# Patient Record
Sex: FEMALE | Race: WHITE | Employment: OTHER | URBAN - METROPOLITAN AREA
[De-identification: names, ages, dates, MRNs, and addresses within clinical notes are randomized per-mention and may not be internally consistent; named-entity substitution may affect disease eponyms.]

---

## 2017-05-05 LAB
ABO + RH BLD: NORMAL
ABO + RH BLD: NORMAL
BLD GP AB SCN SERPL QL: NEGATIVE
HBV SURFACE AG SERPL QL IA: NEGATIVE
HIV 1+2 AB+HIV1 P24 AG SERPL QL IA: NEGATIVE
RUBELLA ANTIBODY IGG QUANTITATIVE: NORMAL IU/ML
T PALLIDUM IGG SER QL: NEGATIVE

## 2017-06-28 ENCOUNTER — TRANSFERRED RECORDS (OUTPATIENT)
Dept: HEALTH INFORMATION MANAGEMENT | Facility: CLINIC | Age: 32
End: 2017-06-28

## 2017-07-01 ENCOUNTER — HEALTH MAINTENANCE LETTER (OUTPATIENT)
Age: 32
End: 2017-07-01

## 2017-07-13 ENCOUNTER — PRE VISIT (OUTPATIENT)
Dept: MATERNAL FETAL MEDICINE | Facility: CLINIC | Age: 32
End: 2017-07-13

## 2017-07-20 ENCOUNTER — OFFICE VISIT (OUTPATIENT)
Dept: MATERNAL FETAL MEDICINE | Facility: CLINIC | Age: 32
End: 2017-07-20
Attending: OBSTETRICS & GYNECOLOGY
Payer: COMMERCIAL

## 2017-07-20 ENCOUNTER — HOSPITAL ENCOUNTER (OUTPATIENT)
Dept: ULTRASOUND IMAGING | Facility: CLINIC | Age: 32
Discharge: HOME | End: 2017-07-20
Attending: OBSTETRICS & GYNECOLOGY | Admitting: OBSTETRICS & GYNECOLOGY
Payer: COMMERCIAL

## 2017-07-20 DIAGNOSIS — O09.292 H/O FETAL ANOMALY IN PRIOR PREGNANCY, CURRENTLY PREGNANT, SECOND TRIMESTER: Primary | ICD-10-CM

## 2017-07-20 DIAGNOSIS — O26.90 PREGNANCY RELATED CONDITION, UNSPECIFIED TRIMESTER: ICD-10-CM

## 2017-07-20 PROCEDURE — 76811 OB US DETAILED SNGL FETUS: CPT

## 2017-07-20 NOTE — PROGRESS NOTES
Please see imaging tab for complete ultrasound report.      Jeet Bedolla MD  Maternal-Fetal Medicine

## 2017-07-20 NOTE — MR AVS SNAPSHOT
After Visit Summary   7/20/2017    Ekta Mendez    MRN: [id number]           Patient Information     Date Of Birth          1985        Visit Information        Provider Department      7/20/2017 9:15 AM Jeet Bedolla MD [facility] Maternal Fetal Medicine [facility]        Today's Diagnoses     H/O fetal anomaly in prior pregnancy, currently pregnant, second trimester    -  1       Follow-ups after your visit        Who to contact     If you have questions or need follow up information about today's clinic visit or your schedule please contact [facility] MATERNAL FETAL MEDICINE [facility] directly at [phone].  Normal or non-critical lab and imaging results will be communicated to you by Hyper9hart, letter or phone within 4 business days after the clinic has received the results. If you do not hear from us within 7 days, please contact the clinic through Adjugt or phone. If you have a critical or abnormal lab result, we will notify you by phone as soon as possible.  Submit refill requests through TYMR or call your pharmacy and they will forward the refill request to us. Please allow 3 business days for your refill to be completed.          Additional Information About Your Visit        MyChart Information     TYMR gives you secure access to your electronic health record. If you see a primary care provider, you can also send messages to your care team and make appointments. If you have questions, please call your primary care clinic.  If you do not have a primary care provider, please call [phone] and they will assist you.        Care EveryWhere ID     This is your Care EveryWhere ID. This could be used by other organizations to access your [place] medical records  [id number]        Your Vitals Were     Last Period                   03/06/2017            Blood Pressure from Last 3 Encounters:   10/02/14 126/83    Weight from Last 3 Encounters:   09/30/14 70.8 kg (156  lb)              Today, you had the following     No orders found for display       Primary Care Provider Office Phone # Fax #    Kellie Church [phone]       [facility] [address] [facility] [address]  [facility] [address]        Equal Access to Services     [facility] : Hadii aad ku hadasho Soomaali, waaxda luqadaha, qaybta kaalmada adeegyada, waxay lioin maryn luis arcadio erika . So [facility] [phone].    ATENCIÓN: Si habla español, tiene a saunders disposición servicios gratuitos de asistencia lingüística. Llame al [phone].    We comply with applicable federal civil rights laws and [place] laws. We do not discriminate on the basis of race, color, national origin, age, disability sex, sexual orientation or gender identity.            Thank you!     Thank you for choosing [facility]ERNAL FETAL MEDIC[facility]  for your care. Our goal is always to provide you with excellent care. Hearing back from our patients is one way we can continue to improve our services. Please take a few minutes to complete the written survey that you may receive in the mail after your visit with us. Thank you!             Your Updated Medication List - Protect others around you: Learn how to safely use, store and throw away your medicines at www.disposemymeds.org.          This list is accurate as of: 7/20/17  9:51 AM.  Always use your most recent med list.                   Brand Name Dispense Instructions for use Diagnosis    acetaminophen 325 MG tablet    TYLENOL     Take 2 tablets (650 mg) by mouth every 4 hours as needed for mild pain (fever greater than 102?F)    Indication for care in labor or delivery       ibuprofen 400-800 mg tablet    ADVIL,MOTRIN     Take 1-2 tablets (400-800 mg) by mouth every 6 hours as needed for other (cramping)    Indication for care in labor or delivery       PRENATAL VITAMINS PO      Take 1 tablet by mouth daily

## 2017-11-15 LAB — GROUP B STREP PCR: POSITIVE

## 2017-11-29 ENCOUNTER — HOSPITAL ENCOUNTER (INPATIENT)
Facility: CLINIC | Age: 32
LOS: 1 days | Discharge: HOME HEALTH | End: 2017-11-30
Attending: OBSTETRICS & GYNECOLOGY | Admitting: OBSTETRICS & GYNECOLOGY
Payer: COMMERCIAL

## 2017-11-29 LAB
A1 MICROGLOB PLACENTAL VAG QL: POSITIVE
ABO + RH BLD: NORMAL
ABO + RH BLD: NORMAL
BLD GP AB SCN SERPL QL: NORMAL
BLOOD BANK CMNT PATIENT-IMP: NORMAL
HGB BLD-MCNC: 12.9 G/DL (ref 11.7–15.7)
SPECIMEN EXP DATE BLD: NORMAL
T PALLIDUM IGG+IGM SER QL: NEGATIVE

## 2017-11-29 PROCEDURE — 25000132 ZZH RX MED GY IP 250 OP 250 PS 637: Performed by: OBSTETRICS & GYNECOLOGY

## 2017-11-29 PROCEDURE — 86900 BLOOD TYPING SEROLOGIC ABO: CPT | Performed by: OBSTETRICS & GYNECOLOGY

## 2017-11-29 PROCEDURE — 85018 HEMOGLOBIN: CPT | Performed by: OBSTETRICS & GYNECOLOGY

## 2017-11-29 PROCEDURE — 86850 RBC ANTIBODY SCREEN: CPT | Performed by: OBSTETRICS & GYNECOLOGY

## 2017-11-29 PROCEDURE — 12000037 ZZH R&B POSTPARTUM INTERMEDIATE

## 2017-11-29 PROCEDURE — 72200001 ZZH LABOR CARE VAGINAL DELIVERY SINGLE

## 2017-11-29 PROCEDURE — 36415 COLL VENOUS BLD VENIPUNCTURE: CPT | Performed by: OBSTETRICS & GYNECOLOGY

## 2017-11-29 PROCEDURE — 99215 OFFICE O/P EST HI 40 MIN: CPT | Mod: 25

## 2017-11-29 PROCEDURE — 84112 EVAL AMNIOTIC FLUID PROTEIN: CPT | Performed by: OBSTETRICS & GYNECOLOGY

## 2017-11-29 PROCEDURE — 86780 TREPONEMA PALLIDUM: CPT | Performed by: OBSTETRICS & GYNECOLOGY

## 2017-11-29 PROCEDURE — 86901 BLOOD TYPING SEROLOGIC RH(D): CPT | Performed by: OBSTETRICS & GYNECOLOGY

## 2017-11-29 PROCEDURE — 25000125 ZZHC RX 250: Performed by: OBSTETRICS & GYNECOLOGY

## 2017-11-29 PROCEDURE — 59025 FETAL NON-STRESS TEST: CPT

## 2017-11-29 PROCEDURE — 25000128 H RX IP 250 OP 636: Performed by: OBSTETRICS & GYNECOLOGY

## 2017-11-29 RX ORDER — LANOLIN 100 %
OINTMENT (GRAM) TOPICAL
Status: DISCONTINUED | OUTPATIENT
Start: 2017-11-29 | End: 2017-11-30 | Stop reason: HOSPADM

## 2017-11-29 RX ORDER — IBUPROFEN 400 MG/1
800 TABLET, FILM COATED ORAL
Status: COMPLETED | OUTPATIENT
Start: 2017-11-29 | End: 2017-11-29

## 2017-11-29 RX ORDER — FENTANYL CITRATE 50 UG/ML
50-100 INJECTION, SOLUTION INTRAMUSCULAR; INTRAVENOUS
Status: DISCONTINUED | OUTPATIENT
Start: 2017-11-29 | End: 2017-11-30

## 2017-11-29 RX ORDER — OXYTOCIN/0.9 % SODIUM CHLORIDE 30/500 ML
100-340 PLASTIC BAG, INJECTION (ML) INTRAVENOUS CONTINUOUS PRN
Status: COMPLETED | OUTPATIENT
Start: 2017-11-29 | End: 2017-11-29

## 2017-11-29 RX ORDER — NALOXONE HYDROCHLORIDE 0.4 MG/ML
.1-.4 INJECTION, SOLUTION INTRAMUSCULAR; INTRAVENOUS; SUBCUTANEOUS
Status: DISCONTINUED | OUTPATIENT
Start: 2017-11-29 | End: 2017-11-30 | Stop reason: HOSPADM

## 2017-11-29 RX ORDER — AMOXICILLIN 250 MG
1 CAPSULE ORAL 2 TIMES DAILY PRN
Status: DISCONTINUED | OUTPATIENT
Start: 2017-11-29 | End: 2017-11-30 | Stop reason: HOSPADM

## 2017-11-29 RX ORDER — PRENATAL VIT/IRON FUM/FOLIC AC 27MG-0.8MG
1 TABLET ORAL DAILY
Status: DISCONTINUED | OUTPATIENT
Start: 2017-11-29 | End: 2017-11-30 | Stop reason: HOSPADM

## 2017-11-29 RX ORDER — CARBOPROST TROMETHAMINE 250 UG/ML
250 INJECTION, SOLUTION INTRAMUSCULAR
Status: DISCONTINUED | OUTPATIENT
Start: 2017-11-29 | End: 2017-11-30

## 2017-11-29 RX ORDER — OXYTOCIN/0.9 % SODIUM CHLORIDE 30/500 ML
340 PLASTIC BAG, INJECTION (ML) INTRAVENOUS CONTINUOUS PRN
Status: DISCONTINUED | OUTPATIENT
Start: 2017-11-29 | End: 2017-11-30 | Stop reason: HOSPADM

## 2017-11-29 RX ORDER — NALOXONE HYDROCHLORIDE 0.4 MG/ML
.1-.4 INJECTION, SOLUTION INTRAMUSCULAR; INTRAVENOUS; SUBCUTANEOUS
Status: DISCONTINUED | OUTPATIENT
Start: 2017-11-29 | End: 2017-11-30

## 2017-11-29 RX ORDER — ACETAMINOPHEN 325 MG/1
650 TABLET ORAL EVERY 4 HOURS PRN
Status: DISCONTINUED | OUTPATIENT
Start: 2017-11-29 | End: 2017-11-30 | Stop reason: HOSPADM

## 2017-11-29 RX ORDER — OXYTOCIN 10 [USP'U]/ML
10 INJECTION, SOLUTION INTRAMUSCULAR; INTRAVENOUS
Status: DISCONTINUED | OUTPATIENT
Start: 2017-11-29 | End: 2017-11-30

## 2017-11-29 RX ORDER — OXYCODONE AND ACETAMINOPHEN 5; 325 MG/1; MG/1
1 TABLET ORAL
Status: DISCONTINUED | OUTPATIENT
Start: 2017-11-29 | End: 2017-11-30

## 2017-11-29 RX ORDER — ACETAMINOPHEN 325 MG/1
650 TABLET ORAL EVERY 4 HOURS PRN
Status: DISCONTINUED | OUTPATIENT
Start: 2017-11-29 | End: 2017-11-30

## 2017-11-29 RX ORDER — HYDROCODONE BITARTRATE AND ACETAMINOPHEN 5; 325 MG/1; MG/1
1 TABLET ORAL EVERY 4 HOURS PRN
Status: DISCONTINUED | OUTPATIENT
Start: 2017-11-29 | End: 2017-11-30 | Stop reason: HOSPADM

## 2017-11-29 RX ORDER — IBUPROFEN 400 MG/1
800 TABLET, FILM COATED ORAL EVERY 6 HOURS PRN
Status: DISCONTINUED | OUTPATIENT
Start: 2017-11-29 | End: 2017-11-30 | Stop reason: HOSPADM

## 2017-11-29 RX ORDER — PENICILLIN G POTASSIUM 5000000 [IU]/1
5 INJECTION, POWDER, FOR SOLUTION INTRAMUSCULAR; INTRAVENOUS ONCE
Status: COMPLETED | OUTPATIENT
Start: 2017-11-29 | End: 2017-11-29

## 2017-11-29 RX ORDER — OXYTOCIN/0.9 % SODIUM CHLORIDE 30/500 ML
100 PLASTIC BAG, INJECTION (ML) INTRAVENOUS CONTINUOUS
Status: DISCONTINUED | OUTPATIENT
Start: 2017-11-29 | End: 2017-11-30 | Stop reason: HOSPADM

## 2017-11-29 RX ORDER — AMOXICILLIN 250 MG
2 CAPSULE ORAL 2 TIMES DAILY PRN
Status: DISCONTINUED | OUTPATIENT
Start: 2017-11-29 | End: 2017-11-30 | Stop reason: HOSPADM

## 2017-11-29 RX ORDER — SODIUM CHLORIDE, SODIUM LACTATE, POTASSIUM CHLORIDE, CALCIUM CHLORIDE 600; 310; 30; 20 MG/100ML; MG/100ML; MG/100ML; MG/100ML
INJECTION, SOLUTION INTRAVENOUS CONTINUOUS
Status: DISCONTINUED | OUTPATIENT
Start: 2017-11-29 | End: 2017-11-30

## 2017-11-29 RX ORDER — BISACODYL 10 MG
10 SUPPOSITORY, RECTAL RECTAL DAILY PRN
Status: DISCONTINUED | OUTPATIENT
Start: 2017-12-01 | End: 2017-11-30 | Stop reason: HOSPADM

## 2017-11-29 RX ORDER — HYDROCORTISONE 2.5 %
CREAM (GRAM) TOPICAL 3 TIMES DAILY PRN
Status: DISCONTINUED | OUTPATIENT
Start: 2017-11-29 | End: 2017-11-30 | Stop reason: HOSPADM

## 2017-11-29 RX ORDER — ONDANSETRON 2 MG/ML
4 INJECTION INTRAMUSCULAR; INTRAVENOUS EVERY 6 HOURS PRN
Status: DISCONTINUED | OUTPATIENT
Start: 2017-11-29 | End: 2017-11-30

## 2017-11-29 RX ORDER — MISOPROSTOL 200 UG/1
400 TABLET ORAL
Status: DISCONTINUED | OUTPATIENT
Start: 2017-11-29 | End: 2017-11-30 | Stop reason: HOSPADM

## 2017-11-29 RX ORDER — OXYTOCIN 10 [USP'U]/ML
10 INJECTION, SOLUTION INTRAMUSCULAR; INTRAVENOUS
Status: DISCONTINUED | OUTPATIENT
Start: 2017-11-29 | End: 2017-11-30 | Stop reason: HOSPADM

## 2017-11-29 RX ORDER — METHYLERGONOVINE MALEATE 0.2 MG/ML
200 INJECTION INTRAVENOUS
Status: DISCONTINUED | OUTPATIENT
Start: 2017-11-29 | End: 2017-11-30

## 2017-11-29 RX ADMIN — ACETAMINOPHEN 650 MG: 325 TABLET, FILM COATED ORAL at 12:15

## 2017-11-29 RX ADMIN — SODIUM CHLORIDE 2.5 MILLION UNITS: 9 INJECTION, SOLUTION INTRAVENOUS at 10:09

## 2017-11-29 RX ADMIN — PENICILLIN G POTASSIUM 5 MILLION UNITS: 5000000 POWDER, FOR SOLUTION INTRAMUSCULAR; INTRAPLEURAL; INTRATHECAL; INTRAVENOUS at 06:37

## 2017-11-29 RX ADMIN — IBUPROFEN 800 MG: 400 TABLET ORAL at 12:14

## 2017-11-29 RX ADMIN — OXYTOCIN-SODIUM CHLORIDE 0.9% IV SOLN 30 UNIT/500ML 340 ML/HR: 30-0.9/5 SOLUTION at 10:56

## 2017-11-29 RX ADMIN — ACETAMINOPHEN 650 MG: 325 TABLET, FILM COATED ORAL at 19:50

## 2017-11-29 RX ADMIN — IBUPROFEN 800 MG: 400 TABLET ORAL at 19:50

## 2017-11-29 NOTE — IP AVS SNAPSHOT
MRN:[id number]                      After Visit Summary   11/29/2017    Ekta Mendez    MRN: [id number]           Thank you!     Thank you for choosing [facility] for your care. Our goal is always to provide you with excellent care. Hearing back from our patients is one way we can continue to improve our services. Please take a few minutes to complete the written survey that you may receive in the mail after you visit with us. Thank you!        Patient Information     Date Of Birth          1985        About your hospital stay     You were admitted on:  November 29, 2017 You last received care in the:  [address]    You were discharged on:  November 30, 2017       Who to Call     For medical emergencies, please call 911.  For non-urgent questions about your medical care, please call your primary care provider or clinic, [phone]          Attending Provider     Provider Specialty    Benoit Sherman MD OB/Gyn    Roslyn Bonilla MD OB/Gyn       Primary Care Provider Office Phone # Fax #    Roslyn Bonilla [phone]      Further instructions from your care team       Postpartum Vaginal Delivery Instructions    Activity       Ask family and friends for help when you need it.    Do not place anything in your vagina for 6 weeks.    You are not restricted on other activities, but take it easy for a few weeks to allow your body to recover from delivery.  You are able to do any activities you feel up to that point.    No driving until you have stopped taking your pain medications (usually two weeks after delivery).     Call your health care provider if you have any of these symptoms:       Increased pain, swelling, redness, or fluid around your stiches from an episiotomy or perineal tear.    A fever above 100.4 F (38 C) with or without chills when placing a thermometer under your tongue.    You soak a sanitary pad with blood within  "1 hour, or you see blood clots larger than a golf ball.    Bleeding that lasts more than 6 weeks.    Vaginal discharge that smells bad.    Severe pain, cramping or tenderness in your lower belly area.    A need to urinate more frequently (use the toilet more often), more urgently (use the toilet very quickly), or it burns when you urinate.    Nausea and vomiting.    Redness, swelling or pain around a vein in your leg.    Problems breastfeeding or a red or painful area on your breast.    Chest pain and cough or are gasping for air.    Problems coping with sadness, anxiety, or depression.  If you have any concerns about hurting yourself or the baby, call your provider immediately.     You have questions or concerns after you return home.     Keep your hands clean:  Always wash your hands before touching your perineal area and stitches.  This helps reduce your risk of infection.  If your hands aren't dirty, you may use an alcohol hand-rub to clean your hands. Keep your nails clean and short.     Follow up in 6 weeks       Pending Results     No orders found for last 3 day(s).            Admission Information     Date & Time Provider Department Dept. Phone    11/29/2017 Roslyn Bonilla MD [address] [phone]      Your Vitals Were     Blood Pressure Temperature Respirations Height Weight Last Period    114/74 97.2  F (36.2  C) 16 1.626 m (5' 4\") 70.8 kg (156 lb) 03/06/2017    BMI (Body Mass Index)                   26.78 kg/m2           MOGhart Information     [employer] gives you secure access to your electronic health record. If you see a primary care provider, you can also send messages to your care team and make appointments. If you have questions, please call your primary care clinic.  If you do not have a primary care provider, please call [phone] and they will assist you.        Care EveryWhere ID     This is your Care EveryWhere ID. This could be used by other organizations " to access your [place] medical records  [id number]        Equal Access to Services     SHABANA PASTOR : Hadii lyssa Avila, maverick borrero, yasmeen chamorro, kelechi galicia. So [facility] [phone].    ATENCIÓN: Si habla español, tiene a saunders disposición servicios gratuitos de asistencia lingüística. Llame al [phone].    We comply with applicable federal civil rights laws and [place] laws. We do not discriminate on the basis of race, color, national origin, age, disability, sex, sexual orientation, or gender identity.               Review of your medicines      UNREVIEWED medicines. Ask your doctor about these medicines        Dose / Directions    PRENATAL VITAMINS PO        Dose:  1 tablet   Take 1 tablet by mouth daily   Refills:  0                Protect others around you: Learn how to safely use, store and throw away your medicines at www.disposemymeds.org.             Medication List: This is a list of all your medications and when to take them. Check marks below indicate your daily home schedule. Keep this list as a reference.      Medications           Morning Afternoon Evening Bedtime As Needed    PRENATAL VITAMINS PO   Take 1 tablet by mouth daily

## 2017-11-29 NOTE — PLAN OF CARE
Problem: Patient Care Overview  Goal: Plan of Care/Patient Progress Review  Outcome: No Change  1320 Pt. admitted from L&D  via W/C and transferred to bed with standby assist. Pt. arrived with baby and was accompanied by  and arrived with personal belongings. Report was taken from bryce in L&D. VSS. Fundus is firm and midline.  Vaginal bleeding is scant.  IV infusing.  Pt. oriented to the room and call light system.

## 2017-11-29 NOTE — IP AVS SNAPSHOT
[address]    [place] [address]    Phone:  [phone]                                       After Visit Summary   11/29/2017    Ekta Mendez    MRN: [id number]           After Visit Summary Signature Page     I have received my discharge instructions, and my questions have been answered. I have discussed any challenges I see with this plan with the nurse or doctor.    ..........................................................................................................................................  Patient/Patient Representative Signature      ..........................................................................................................................................  Patient Representative Print Name and Relationship to Patient    ..................................................               ................................................  Date                                            Time    ..........................................................................................................................................  Reviewed by Signature/Title    ...................................................              ..............................................  Date                                                            Time

## 2017-11-29 NOTE — PROGRESS NOTES
Admission date: 2017  Delivery date:  17  Place of delivery: [facility]    The patient is a 32 year old  at 38w2d  wks gestation admitted to labor and delivery in active labor.  Her  course was uncomplicated.  The estimate fetal weight is 7 lbs.  Her GBS was positive and she received 2 doses of IV antiobiotics.    She required no labor augmentation or labor analgesia.  Pitocin was used after delivery.   Membranes spontaneously ruptured and the fluid was clear.    She progressed to complete dilation and had excellent maternal expulsive efforts, and  she delivered a viable female infant weighing 7 pounds 4 ounces with apgars of 9 at 1 min and 9 at 5 minutes, via a normal spontaneous vaginal delivery over an intact perineum. A nuchal cord was reduced after delivery.  The infant was vigorous, and mouth and nose were bulb suctioned upon delivery.  The infant was handed off to her  parents and the nursery team in attendance.    The placenta delivered spontaneously and intact.  The uterus was made firm with IV pitocin and uterine massage.  The estimated blood loss was 200mL.    The cervix and vagina were inspected. A midline episiotomy was cut due to terminal bradycardia which was repaired with 3-0 vicryl assuring hemostasis and close approximation.  The patient tolerated this well.     During labor the fetal heart tones were tier 2.    Mother and baby did well and went to normal postpartum and  nursery

## 2017-11-29 NOTE — PLAN OF CARE
Data: [facility]   Reason for maternal/fetal assessment per patient is Fluid Leak  Patient is a . Prenatal record reviewed.   Medical history: History reviewed. No pertinent past medical history.. Gestational Age 38w2d. VSS. Fetal movement present. Patient denies cramping, backache, vaginal discharge, pelvic pressure, UTI symptoms, GI problems, bloody show, vaginal bleeding, edema, headache, visual disturbances, epigastric or URQ pain, abdominal pain.  Rupture of membranes at 0425 clear fluid. Support persons Micah present.  Action: Verbal consent for EFM. Triage assessment completed. EFM applied for fetal wellbeing during fluid leak. Uterine assessment soft and non tender to touch. Fetal assessment: Presumed adequate fetal oxygenation documented (see flow record).   Response: Dr. Sherman and Dr. Singh informed of but not limited to above information, prenatal history, SVE, FHT's, contraction pattern, and amnisure positive. Plan per provider is admit to labor and delivery.  May have standard labor pain medication for pain management.  Start PCN for GBS+.  Recheck cervix at 0800 and update Dr. Singh. Patient verbalized agreement with plan. Patient transferred to room 219 ambulatory, oriented to room and call light. Report given to Frida ANDERS RN.

## 2017-11-29 NOTE — H&P
[facility] Labor and Delivery History and Physical    Ekta Mendez MRN# [id number]   Age: 32 year old YOB: 1985     Date of Admission:  2017    Primary care provider: Roslyn Bonilla           Chief Complaint:   Ekta Mendez is a 32 year old female who is 38w2d pregnant and being admitted for SROM.          Pregnancy history:     OBSTETRIC HISTORY:    Obstetric History       T1      L1     SAB0   TAB0   Ectopic0   Multiple0   Live Births1       # Outcome Date GA Lbr Kapil/2nd Weight Sex Delivery Anes PTL Lv   2 Current            1 Term 14 37w4d 04:25 / 00:55 3.455 kg (7 lb 9.9 oz) M  Local  DARRYL      Apgar1:  6                Apgar5: 8          EDC: Estimated Date of Delivery: Dec 11, 2017    Prenatal Labs:   Lab Results   Component Value Date    ABO A 2017    RH Pos 2017    AS Neg 2017    HEPBANG negative 2017    TREPAB negative 2017    HGB 12.9 2017       GBS Status:   Lab Results   Component Value Date    GBS positive 11/15/2017       Active Problem List  Patient Active Problem List   Diagnosis     Encounter for triage in pregnant patient     Indication for care in labor or delivery     Normal labor and delivery       Medication Prior to Admission  Prescriptions Prior to Admission   Medication Sig Dispense Refill Last Dose     Prenatal Multivit-Min-Fe-FA (PRENATAL VITAMINS PO) Take 1 tablet by mouth daily   2017 at Unknown time   .        Maternal Past Medical History:   History reviewed. No pertinent past medical history.                    Family History:   I have reviewed this patient's family history            Social History:     Social History   Substance Use Topics     Smoking status: Never Smoker     Smokeless tobacco: Never Used     Alcohol use No            Review of Systems:   C: NEGATIVE for fever, chills, change in weight  E/M: NEGATIVE for ear, mouth and throat problems  R: NEGATIVE for  significant cough or SOB  CV: NEGATIVE for chest pain, palpitations or peripheral edema          Physical Exam:   Vitals were reviewed    Constitutional: Awake, alert, cooperative, no apparent distress, and appears stated age.  Eyes: Lids and lashes normal, pupils equal, round and reactive to light, extra ocular muscles intact, sclera clear, conjunctiva normal.  ENT: Normocephalic, without obvious abnormality, atramatic, sinuses nontender on palpation, external ears without lesions, oral pharynx with moist mucus membranes, tonsils without erythema or exudates, gums normal and good dentition.  Neck: Supple, symmetrical, trachea midline, no adenopathy, thyroid symmetric, not enlarged and no tenderness, skin normal.  Hematologic / Lymphatic: No cervical lymphadenopathy and no supraclavicular lymphadenopathy.  Back: Symmetric, no curvature, spinous processes are non-tender on palpation, paraspinous muscles are non-tender on palpation, no costal vertebral tenderness.  Lungs: No increased work of breathing, good air exchange, clear to auscultation bilaterally, no crackles or wheezing.  Cardiovascular: Regular rate and rhythm, normal S1 and S2, no S3 or S4, and no murmur noted.  Chest / Breast: Breasts symmetrical, skin without lesion(s), no nipple retraction or dimpling, no nipple discharge, no masses palpated, no axillary or supraclavicular adenopathy.  Abdomen: No scars, normal bowel sounds, soft, non-distended, non-tender, no masses palpated, no hepatosplenomegally.  Genitourinary: No urethral discharge, normal external genitalia, no hernia.  Musculoskeletal: No redness, warmth, or swelling of the joints.  Full range of motion noted.  Motor strength is 5 out of 5 all extremities bilaterally.  Tone is normal.  Neurologic: Awake, alert, oriented to name, place and time.  Cranial nerves II-XII are grossly intact.  Motor is 5 out of 5 bilaterally.  Cerebellar finger to nose, heel to shin intact.  Sensory is intact.   Babinski down going, Romberg negative, and gait is normal.  Neuropsychiatric: Normal affect, mood, orientation, memory and insight.  Skin: No rashes, erythema, pallor, petechia or purpura.     Cervix:   Membranes: SROM   Dilation: 6   Effacement: 80%   Station:-2    Presentation:Cephalic  Fetal Heart Rate Tracing: Tier 1 (normal)  Tocometer: external monitor                       Assessment:   Ekta Mendez is a 38w2d pregnant female admitted with active labor management.          Plan:   Admit - see IP orders    Roslyn Bonilla MD

## 2017-11-29 NOTE — PLAN OF CARE
Problem: Patient Care Overview  Goal: Plan of Care/Patient Progress Review  Outcome: Improving  Assumed care at 0715. Pt requesting no analgesia with her labor and delivery. Progressed to complete at 1040. Viable female delivered  at 1051. apgars 9&9. See delivery record for info. gbs + treated with penicillin x2.

## 2017-11-30 VITALS
DIASTOLIC BLOOD PRESSURE: 74 MMHG | HEIGHT: 64 IN | BODY MASS INDEX: 26.63 KG/M2 | WEIGHT: 156 LBS | RESPIRATION RATE: 16 BRPM | TEMPERATURE: 97.2 F | SYSTOLIC BLOOD PRESSURE: 114 MMHG

## 2017-11-30 PROCEDURE — 25000132 ZZH RX MED GY IP 250 OP 250 PS 637: Performed by: OBSTETRICS & GYNECOLOGY

## 2017-11-30 RX ADMIN — ACETAMINOPHEN 650 MG: 325 TABLET, FILM COATED ORAL at 13:26

## 2017-11-30 RX ADMIN — IBUPROFEN 800 MG: 400 TABLET ORAL at 02:05

## 2017-11-30 RX ADMIN — ACETAMINOPHEN 650 MG: 325 TABLET, FILM COATED ORAL at 07:55

## 2017-11-30 RX ADMIN — ACETAMINOPHEN 650 MG: 325 TABLET, FILM COATED ORAL at 02:05

## 2017-11-30 RX ADMIN — IBUPROFEN 800 MG: 400 TABLET ORAL at 07:55

## 2017-11-30 RX ADMIN — IBUPROFEN 800 MG: 400 TABLET ORAL at 13:26

## 2017-11-30 RX ADMIN — SENNOSIDES AND DOCUSATE SODIUM 1 TABLET: 8.6; 5 TABLET ORAL at 07:55

## 2017-11-30 NOTE — DISCHARGE INSTRUCTIONS

## 2017-11-30 NOTE — PROGRESS NOTES
[facility]   Post-Partum Progress Note          Assessment and Plan:    Assessment:   Post-partum day #1  Normal spontaneous vaginal delivery  L&D complications: None      Doing well.  No excessive bleeding  Pain well-controlled.      Plan:   Ambulation encouraged, anticipate discharge tomorrow           Interval History:   Doing well.  Pain is well-controlled.  No fevers.  No history of foul-smelling vaginal discharge.  Good appetite.  Denies chest pain, shortness of breath, nausea or vomiting.  Vaginal bleeding is similar to a heavy menstrual flow.  Ambulatory.  Breastfeeding well.            Significant Problems:    None          Review of Systems:    The patient denies any chest pain, shortness of breath, excessive pain, fever, chills, purulent drainage from the wound, nausea or vomiting.          Medications:   All medications related to the patient's surgery have been reviewed          Physical Exam:   All vitals stable  Uterine fundus is firm, non-tender and at the level of the umbilicus          Data:       Brandon Perdomo MD, MD

## 2017-11-30 NOTE — LACTATION NOTE
Initial Lactation visit. Hand out given. Recommend unlimited, frequent breast feedings: At least 8 - 12 times every 24 hours. Avoid pacifiers and supplementation with formula unless medically indicated. Explained benefits of holding baby skin on skin to help promote better breastfeeding outcomes.  Infant has been feeding well.  Reviewed second night and feeding expectations as Ekta plans to discharge early today.  Outpatient lactation resources discussed if needing any assistance.       Franchesca Mena RN, IBCLC

## 2017-11-30 NOTE — PLAN OF CARE
Problem: Patient Care Overview  Goal: Plan of Care/Patient Progress Review  Outcome: No Change  Vital signs stable. Patient voiding without difficulty. Able to ambulate in room free of dizziness. Taking tylenol/ibuprofen for pain management. Working on breastfeeding infant every 2-3 hours. Encouraged to call with questions/concerns. Will continue to monitor.

## 2017-11-30 NOTE — PLAN OF CARE
Problem: Patient Care Overview  Goal: Plan of Care/Patient Progress Review  Outcome: Adequate for Discharge Date Met: 11/30/17  D: VSS, assessments WDL.   I: Pt. received complete discharge paperwork.  Pt. was given times of last dose for all discharge medications in writing on discharge medication sheets.  Discharge teaching included home medication, pain management, activity restrictions, postpartum cares, and signs and symptoms of infection.    A: Discharge outcomes on care plan met.  Mother states understanding and comfort with self and baby cares.  P: Pt. discharged to home.  Pt. was discharged with baby, and bands were checked at time of discharge.  Pt. was accompanied by , nurse and baby, and left with personal belongings.  Home care called.  Pt. to follow up with OB per MD order.  Pt. had no further questions at the time of discharge and no unmet needs were identified.

## 2017-11-30 NOTE — PLAN OF CARE
Problem: Patient Care Overview  Goal: Plan of Care/Patient Progress Review  Outcome: Improving  VSS, Breastfeeding.  Fundus is firm at U/1, scant flow, small clot x 1.  Pain controlled with Tylenol, Ibuprofen and ice packs.  Independent with cares.   is at bedside and supportive.  Will continue to monitor and support.

## 2019-08-21 LAB
ABO + RH BLD: NORMAL
ABO + RH BLD: NORMAL
BLD GP AB SCN SERPL QL: NORMAL
HBV SURFACE AG SERPL QL IA: NORMAL
HIV 1+2 AB+HIV1 P24 AG SERPL QL IA: NORMAL
RUBELLA ANTIBODY IGG QUANTITATIVE: NORMAL IU/ML
TREPONEMA ANTIBODIES: NON REACTIVE

## 2019-10-03 ENCOUNTER — HEALTH MAINTENANCE LETTER (OUTPATIENT)
Age: 34
End: 2019-10-03

## 2020-02-01 LAB — GROUP B STREP PCR: NORMAL

## 2020-02-07 LAB — GROUP B STREP PCR: NORMAL

## 2020-02-26 ENCOUNTER — HOSPITAL ENCOUNTER (INPATIENT)
Facility: CLINIC | Age: 35
LOS: 2 days | Discharge: HOME | End: 2020-02-28
Attending: OBSTETRICS & GYNECOLOGY | Admitting: OBSTETRICS & GYNECOLOGY
Payer: COMMERCIAL

## 2020-02-26 LAB
ABO + RH BLD: NORMAL
ABO + RH BLD: NORMAL
SPECIMEN EXP DATE BLD: NORMAL

## 2020-02-26 PROCEDURE — 25800030 ZZH RX IP 258 OP 636: Performed by: PHYSICIAN ASSISTANT

## 2020-02-26 PROCEDURE — 25000125 ZZHC RX 250: Performed by: OBSTETRICS & GYNECOLOGY

## 2020-02-26 PROCEDURE — 86901 BLOOD TYPING SEROLOGIC RH(D): CPT | Performed by: PHYSICIAN ASSISTANT

## 2020-02-26 PROCEDURE — 86900 BLOOD TYPING SEROLOGIC ABO: CPT | Performed by: PHYSICIAN ASSISTANT

## 2020-02-26 PROCEDURE — 25000125 ZZHC RX 250: Performed by: PHYSICIAN ASSISTANT

## 2020-02-26 PROCEDURE — 72200001 ZZH LABOR CARE VAGINAL DELIVERY SINGLE

## 2020-02-26 PROCEDURE — 10907ZC DRAINAGE OF AMNIOTIC FLUID, THERAPEUTIC FROM PRODUCTS OF CONCEPTION, VIA NATURAL OR ARTIFICIAL OPENING: ICD-10-PCS | Performed by: OBSTETRICS & GYNECOLOGY

## 2020-02-26 PROCEDURE — 25000128 H RX IP 250 OP 636: Performed by: PHYSICIAN ASSISTANT

## 2020-02-26 PROCEDURE — 25000132 ZZH RX MED GY IP 250 OP 250 PS 637: Performed by: OBSTETRICS & GYNECOLOGY

## 2020-02-26 PROCEDURE — 86780 TREPONEMA PALLIDUM: CPT | Performed by: PHYSICIAN ASSISTANT

## 2020-02-26 PROCEDURE — 36415 COLL VENOUS BLD VENIPUNCTURE: CPT | Performed by: PHYSICIAN ASSISTANT

## 2020-02-26 PROCEDURE — 0KQM0ZZ REPAIR PERINEUM MUSCLE, OPEN APPROACH: ICD-10-PCS | Performed by: OBSTETRICS & GYNECOLOGY

## 2020-02-26 PROCEDURE — 12000035 ZZH R&B POSTPARTUM

## 2020-02-26 RX ORDER — CARBOPROST TROMETHAMINE 250 UG/ML
250 INJECTION, SOLUTION INTRAMUSCULAR
Status: DISCONTINUED | OUTPATIENT
Start: 2020-02-26 | End: 2020-02-28 | Stop reason: HOSPADM

## 2020-02-26 RX ORDER — HYDROCORTISONE 2.5 %
CREAM (GRAM) TOPICAL 3 TIMES DAILY PRN
Status: DISCONTINUED | OUTPATIENT
Start: 2020-02-26 | End: 2020-02-28 | Stop reason: HOSPADM

## 2020-02-26 RX ORDER — BISACODYL 10 MG
10 SUPPOSITORY, RECTAL RECTAL DAILY PRN
Status: DISCONTINUED | OUTPATIENT
Start: 2020-02-28 | End: 2020-02-28 | Stop reason: HOSPADM

## 2020-02-26 RX ORDER — LIDOCAINE 40 MG/G
CREAM TOPICAL
Status: DISCONTINUED | OUTPATIENT
Start: 2020-02-26 | End: 2020-02-28 | Stop reason: HOSPADM

## 2020-02-26 RX ORDER — OXYTOCIN 10 [USP'U]/ML
10 INJECTION, SOLUTION INTRAMUSCULAR; INTRAVENOUS
Status: DISCONTINUED | OUTPATIENT
Start: 2020-02-26 | End: 2020-02-28 | Stop reason: HOSPADM

## 2020-02-26 RX ORDER — SODIUM CHLORIDE, SODIUM LACTATE, POTASSIUM CHLORIDE, CALCIUM CHLORIDE 600; 310; 30; 20 MG/100ML; MG/100ML; MG/100ML; MG/100ML
INJECTION, SOLUTION INTRAVENOUS CONTINUOUS
Status: DISCONTINUED | OUTPATIENT
Start: 2020-02-26 | End: 2020-02-28 | Stop reason: HOSPADM

## 2020-02-26 RX ORDER — ACETAMINOPHEN 325 MG/1
650 TABLET ORAL EVERY 4 HOURS PRN
Status: DISCONTINUED | OUTPATIENT
Start: 2020-02-26 | End: 2020-02-26

## 2020-02-26 RX ORDER — PENICILLIN G POTASSIUM 5000000 [IU]/1
5 INJECTION, POWDER, FOR SOLUTION INTRAMUSCULAR; INTRAVENOUS ONCE
Status: COMPLETED | OUTPATIENT
Start: 2020-02-26 | End: 2020-02-26

## 2020-02-26 RX ORDER — NALOXONE HYDROCHLORIDE 0.4 MG/ML
.1-.4 INJECTION, SOLUTION INTRAMUSCULAR; INTRAVENOUS; SUBCUTANEOUS
Status: DISCONTINUED | OUTPATIENT
Start: 2020-02-26 | End: 2020-02-28 | Stop reason: HOSPADM

## 2020-02-26 RX ORDER — PRENATAL VIT/IRON FUM/FOLIC AC 27MG-0.8MG
1 TABLET ORAL DAILY
Status: DISCONTINUED | OUTPATIENT
Start: 2020-02-26 | End: 2020-02-28 | Stop reason: HOSPADM

## 2020-02-26 RX ORDER — IBUPROFEN 400 MG/1
800 TABLET, FILM COATED ORAL EVERY 6 HOURS PRN
Status: DISCONTINUED | OUTPATIENT
Start: 2020-02-26 | End: 2020-02-28 | Stop reason: HOSPADM

## 2020-02-26 RX ORDER — LANOLIN 100 %
OINTMENT (GRAM) TOPICAL
Status: DISCONTINUED | OUTPATIENT
Start: 2020-02-26 | End: 2020-02-28 | Stop reason: HOSPADM

## 2020-02-26 RX ORDER — METHYLERGONOVINE MALEATE 0.2 MG/ML
200 INJECTION INTRAVENOUS
Status: DISCONTINUED | OUTPATIENT
Start: 2020-02-26 | End: 2020-02-28 | Stop reason: HOSPADM

## 2020-02-26 RX ORDER — ONDANSETRON 2 MG/ML
4 INJECTION INTRAMUSCULAR; INTRAVENOUS EVERY 6 HOURS PRN
Status: DISCONTINUED | OUTPATIENT
Start: 2020-02-26 | End: 2020-02-28 | Stop reason: HOSPADM

## 2020-02-26 RX ORDER — ACETAMINOPHEN 325 MG/1
650 TABLET ORAL EVERY 4 HOURS PRN
Status: DISCONTINUED | OUTPATIENT
Start: 2020-02-26 | End: 2020-02-28 | Stop reason: HOSPADM

## 2020-02-26 RX ORDER — AMOXICILLIN 250 MG
2 CAPSULE ORAL 2 TIMES DAILY
Status: DISCONTINUED | OUTPATIENT
Start: 2020-02-26 | End: 2020-02-28 | Stop reason: HOSPADM

## 2020-02-26 RX ORDER — IBUPROFEN 400 MG/1
800 TABLET, FILM COATED ORAL
Status: DISCONTINUED | OUTPATIENT
Start: 2020-02-26 | End: 2020-02-26

## 2020-02-26 RX ORDER — OXYTOCIN/0.9 % SODIUM CHLORIDE 30/500 ML
340 PLASTIC BAG, INJECTION (ML) INTRAVENOUS CONTINUOUS PRN
Status: DISCONTINUED | OUTPATIENT
Start: 2020-02-26 | End: 2020-02-28 | Stop reason: HOSPADM

## 2020-02-26 RX ORDER — OXYTOCIN/0.9 % SODIUM CHLORIDE 30/500 ML
1-24 PLASTIC BAG, INJECTION (ML) INTRAVENOUS CONTINUOUS
Status: DISCONTINUED | OUTPATIENT
Start: 2020-02-26 | End: 2020-02-28 | Stop reason: HOSPADM

## 2020-02-26 RX ORDER — AMOXICILLIN 250 MG
1 CAPSULE ORAL 2 TIMES DAILY
Status: DISCONTINUED | OUTPATIENT
Start: 2020-02-26 | End: 2020-02-28 | Stop reason: HOSPADM

## 2020-02-26 RX ORDER — OXYTOCIN/0.9 % SODIUM CHLORIDE 30/500 ML
100-340 PLASTIC BAG, INJECTION (ML) INTRAVENOUS CONTINUOUS PRN
Status: DISCONTINUED | OUTPATIENT
Start: 2020-02-26 | End: 2020-02-28 | Stop reason: HOSPADM

## 2020-02-26 RX ORDER — OXYCODONE AND ACETAMINOPHEN 5; 325 MG/1; MG/1
1 TABLET ORAL
Status: DISCONTINUED | OUTPATIENT
Start: 2020-02-26 | End: 2020-02-28 | Stop reason: HOSPADM

## 2020-02-26 RX ORDER — OXYTOCIN/0.9 % SODIUM CHLORIDE 30/500 ML
100 PLASTIC BAG, INJECTION (ML) INTRAVENOUS CONTINUOUS
Status: DISCONTINUED | OUTPATIENT
Start: 2020-02-26 | End: 2020-02-28 | Stop reason: HOSPADM

## 2020-02-26 RX ADMIN — SENNOSIDES AND DOCUSATE SODIUM 1 TABLET: 8.6; 5 TABLET ORAL at 20:09

## 2020-02-26 RX ADMIN — SODIUM CHLORIDE, POTASSIUM CHLORIDE, SODIUM LACTATE AND CALCIUM CHLORIDE: 600; 310; 30; 20 INJECTION, SOLUTION INTRAVENOUS at 08:35

## 2020-02-26 RX ADMIN — SODIUM CHLORIDE 2.5 MILLION UNITS: 9 INJECTION, SOLUTION INTRAVENOUS at 13:03

## 2020-02-26 RX ADMIN — IBUPROFEN 800 MG: 400 TABLET, FILM COATED ORAL at 14:59

## 2020-02-26 RX ADMIN — ACETAMINOPHEN 650 MG: 325 TABLET, FILM COATED ORAL at 14:59

## 2020-02-26 RX ADMIN — Medication 100 ML/HR: at 14:45

## 2020-02-26 RX ADMIN — Medication 1 MILLI-UNITS/MIN: at 09:48

## 2020-02-26 RX ADMIN — ACETAMINOPHEN 650 MG: 325 TABLET, FILM COATED ORAL at 20:09

## 2020-02-26 RX ADMIN — PENICILLIN G POTASSIUM 5 MILLION UNITS: 5000000 POWDER, FOR SOLUTION INTRAMUSCULAR; INTRAPLEURAL; INTRATHECAL; INTRAVENOUS at 09:11

## 2020-02-26 ASSESSMENT — MIFFLIN-ST. JEOR: SCORE: 1393.54

## 2020-02-26 NOTE — L&D DELIVERY NOTE
Admission date: 2020  Delivery date:  20  Place of delivery: [facility]    ADMITTING DIAGNOSIS:IUP at 39+1 wks, elective induction    PROCEDURES:NST, labor induction and     HISTORY: The patient is a 34 year old  at 39w1d  wks gestation admitted to labor and delivery for elective induction at patient request.  Her pregnancy was uncomplicated.      Her  labs showed blood type A positive , antibody screen was negative, rubella immune,  Hepatitis B negative, syphilis negative, and her group B strep screen was positive .      The patient was admitted.   She received nothing for pain control. Rupture of membranes was done [id number]. She received 2 doses of antibiotics for + GBS.She progressed to complete by 1402 hours.     The patient began pushing at 1403 hours and delivered a viable male infant at 1407 hours, with Apgars of 8 and 9 at 1 and 5 minutes respectively. The infant weighed 8 pounds 1 ounces.  Delayed cord clamping was performed.      The placenta was delivered intact with a 3-vessel cord. Examination of the cervix and vagina revealed no evidence of lacerations. A second-degree perineal laceration was repaired with 3-0 Vicryl in a routine fashion. QBL was performed.  Mom and baby are stable postpartum.

## 2020-02-26 NOTE — PROGRESS NOTES
"[facility] Labor and Delivery Progress Note    Ekta Mendez MRN# [id number]   Age: 34 year old YOB: 1985           Subjective:     Contractions: 2-4 min  Leakage of fluids: clear          Objective:     Patient Vitals for the past 8 hrs:   BP Temp Temp src Height Weight   20 1200 -- 98.2  F (36.8  C) Temporal -- --   20 1100 -- 98.3  F (36.8  C) Temporal -- --   20 0930 -- 98  F (36.7  C) Temporal -- --   20 0910 120/76 -- -- -- --   20 0900 (!) 75/47 -- -- -- --   20 0846 -- -- -- 1.626 m (5' 4\") 69.9 kg (154 lb)   20 0810 128/77 98.6  F (37  C) Temporal -- --        Cervical Exam:  4-5 cm at 12:45    Membranes: Ruptured      Fetal Heart Rate:    Monitor:      Variability:      Baseline Rate:      Fetal Heart Rate Tracing: Tier 1 (normal)          Assessment:   Ekta Mendez is a 34 year old female who is 39w1d for elective induction          Plan:   Anticipate       Roslyn Bonilla MD    "

## 2020-02-26 NOTE — H&P
[facility] Labor and Delivery History and Physical    Ekta Mendez MRN# [id number]   Age: 34 year old YOB: 1985     Date of Admission:  2020    Primary care provider: Roslyn Bonilla           Chief Complaint:   Ekta Mendez is a 34 year old female who is 39w1d pregnant and being admitted for induction of labor, indication maternal request.          Pregnancy history:     OBSTETRIC HISTORY:    OB History    Para Term  AB Living   3 2 2 0 0 2   SAB TAB Ectopic Multiple Live Births   0 0 0 0 2      # Outcome Date GA Lbr Kapil/2nd Weight Sex Delivery Anes PTL Lv   3 Current            2 Term 17 38w2d 01:40 / 00:11 3.31 kg (7 lb 4.8 oz) F Vag-Spont Local  DARRYL      Name: SINA MENDEZ      Apgar1: 9  Apgar5: 9   1 Term 14 37w4d 04:25 / 00:55 3.455 kg (7 lb 9.9 oz) M  Local  DARRYL      Apgar1: 6  Apgar5: 8       EDC: Estimated Date of Delivery: Mar 3, 2020    Prenatal Labs:   Lab Results   Component Value Date    ABO A 2019    RH Pos 2019    AS Neg 2019    HEPBANG Neg 2019    TREPAB Negative 2017    HGB 12.9 2017       GBS Status:   Lab Results   Component Value Date    GBS Pos 2020       Active Problem List  Patient Active Problem List   Diagnosis     Encounter for triage in pregnant patient     Indication for care in labor or delivery     Normal labor and delivery      (normal spontaneous vaginal delivery)       Medication Prior to Admission  Medications Prior to Admission   Medication Sig Dispense Refill Last Dose     Prenatal Multivit-Min-Fe-FA (PRENATAL VITAMINS PO) Take 1 tablet by mouth daily   2017 at Unknown time   .        Maternal Past Medical History:   No past medical history on file.                    Family History:   I have reviewed this patient's family history            Social History:     Social History     Tobacco Use     Smoking status: Never Smoker     Smokeless  tobacco: Never Used   Substance Use Topics     Alcohol use: No            Review of Systems:   C: NEGATIVE for fever, chills, change in weight  E/M: NEGATIVE for ear, mouth and throat problems  R: NEGATIVE for significant cough or SOB  CV: NEGATIVE for chest pain, palpitations or peripheral edema          Physical Exam:   Vitals were reviewed    Constitutional: Awake, alert, cooperative, no apparent distress, and appears stated age.  Eyes: Lids and lashes normal, pupils equal, round and reactive to light, extra ocular muscles intact, sclera clear, conjunctiva normal.  ENT: Normocephalic, without obvious abnormality, atramatic, sinuses nontender on palpation, external ears without lesions, oral pharynx with moist mucus membranes, tonsils without erythema or exudates, gums normal and good dentition.  Neck: Supple, symmetrical, trachea midline, no adenopathy, thyroid symmetric, not enlarged and no tenderness, skin normal.  Hematologic / Lymphatic: No cervical lymphadenopathy and no supraclavicular lymphadenopathy.  Back: Symmetric, no curvature, spinous processes are non-tender on palpation, paraspinous muscles are non-tender on palpation, no costal vertebral tenderness.  Lungs: No increased work of breathing, good air exchange, clear to auscultation bilaterally, no crackles or wheezing.  Cardiovascular: Regular rate and rhythm, normal S1 and S2, no S3 or S4, and no murmur noted.    Abdomen: No scars, normal bowel sounds, soft, non-distended, non-tender, no masses palpated, no hepatosplenomegally.  Genitourinary: No urethral discharge, normal external genitalia, no hernia.  Musculoskeletal: No redness, warmth, or swelling of the joints.  Full range of motion noted.  Motor strength is 5 out of 5 all extremities bilaterally.  Tone is normal.  Neurologic: Awake, alert, oriented to name, place and time.  Cranial nerves II-XII are grossly intact.  Motor is 5 out of 5 bilaterally.  Cerebellar finger to nose, heel to shin  intact.  Sensory is intact.  Babinski down going, Romberg negative, and gait is normal.  Neuropsychiatric: Normal affect, mood, orientation, memory and insight.  Skin: No rashes, erythema, pallor, petechia or purpura.     Cervix:   Membranes: AROM   Dilation: 2   Effacement: 50%   Station:-2    Presentation:Cephalic  Fetal Heart Rate Tracing: reactive and reassuring  Tocometer: external monitor                       Assessment:   Ekta Mendez is a 39w1d pregnant female admitted with induction of labor, indication maternal request.          Plan:   Admit - see IP orders    Roslyn Bonilla MD

## 2020-02-26 NOTE — PLAN OF CARE
Patient had a natural labor and delivery.  The patient delivered at 1407 a baby boy with Apgars 8 and 9.  Patient is adequately treated with penicillin for her GBS.

## 2020-02-27 LAB — T PALLIDUM AB SER QL: NONREACTIVE

## 2020-02-27 PROCEDURE — 12000035 ZZH R&B POSTPARTUM

## 2020-02-27 PROCEDURE — 25000132 ZZH RX MED GY IP 250 OP 250 PS 637: Performed by: OBSTETRICS & GYNECOLOGY

## 2020-02-27 RX ADMIN — IBUPROFEN 800 MG: 400 TABLET, FILM COATED ORAL at 12:38

## 2020-02-27 RX ADMIN — IBUPROFEN 800 MG: 400 TABLET, FILM COATED ORAL at 18:44

## 2020-02-27 RX ADMIN — IBUPROFEN 800 MG: 400 TABLET, FILM COATED ORAL at 00:07

## 2020-02-27 RX ADMIN — ACETAMINOPHEN 650 MG: 325 TABLET, FILM COATED ORAL at 00:07

## 2020-02-27 RX ADMIN — SENNOSIDES AND DOCUSATE SODIUM 1 TABLET: 8.6; 5 TABLET ORAL at 08:29

## 2020-02-27 RX ADMIN — ACETAMINOPHEN 650 MG: 325 TABLET, FILM COATED ORAL at 12:39

## 2020-02-27 RX ADMIN — IBUPROFEN 800 MG: 400 TABLET, FILM COATED ORAL at 06:44

## 2020-02-27 RX ADMIN — ACETAMINOPHEN 650 MG: 325 TABLET, FILM COATED ORAL at 06:44

## 2020-02-27 RX ADMIN — PRENATAL VITAMINS-IRON FUMARATE 27 MG IRON-FOLIC ACID 0.8 MG TABLET 1 TABLET: at 18:44

## 2020-02-27 RX ADMIN — ACETAMINOPHEN 650 MG: 325 TABLET, FILM COATED ORAL at 18:44

## 2020-02-27 ASSESSMENT — ACTIVITIES OF DAILY LIVING (ADL)
BATHING: 0-->INDEPENDENT
TOILETING: 0-->INDEPENDENT
DRESS: 0-->INDEPENDENT
RETIRED_EATING: 0-->INDEPENDENT
AMBULATION: 0-->INDEPENDENT
COGNITION: 0 - NO COGNITION ISSUES REPORTED
TRANSFERRING: 0-->INDEPENDENT
RETIRED_COMMUNICATION: 0-->UNDERSTANDS/COMMUNICATES WITHOUT DIFFICULTY
FALL_HISTORY_WITHIN_LAST_SIX_MONTHS: NO
SWALLOWING: 0-->SWALLOWS FOODS/LIQUIDS WITHOUT DIFFICULTY

## 2020-02-27 NOTE — LACTATION NOTE
"Initial Lactation visit. Ekta states that she just finished feeding; infant feeding like a \"champ\" and has filled his log book for first 24 hours. Ekta declines having any questions or need for lactation support with feeding.  Recommend unlimited, frequent breast feedings: At least 8 times every 24 hours. Explained benefits of holding baby skin on skin to help promote better breastfeeding outcomes. Minimal eye contact -Encouraged to call if questions arise. Will revisit as needed.    Sara Sharif RN, IBCLC    "

## 2020-02-27 NOTE — PLAN OF CARE
VSS, had Tylenol and Ibuprofen for pain. Using IP and Tucks. Breastfeeding her baby boy ind. Using Lanolin for tender nipples.

## 2020-02-27 NOTE — PROGRESS NOTES
[facility]       DAILY NOTE - POSTPARTUM DAY 1     SUBJECTIVE:     Pain controlled? Yes  Tolerating a regular diet? YES  Ambulating? YES  Voiding without difficulty? Yes    OBJECTIVE:  Vitals:    20 1645 20 0007 2022   BP: 121/77 120/70 121/80 119/78   Resp: 18 16 16 18   Temp: 97.7  F (36.5  C) 98.2  F (36.8  C) 97.6  F (36.4  C) 97.6  F (36.4  C)   TempSrc: Oral Oral Oral Oral   Weight:       Height:           Constitutional: healthy, alert and no distress    Abdomen:  Uterine fundus is firm, non-tender and at the level of the umbilicus     Extremities: no edema        LABS:  Hemoglobin   Date Value Ref Range Status   2017 12.9 11.7 - 15.7 g/dL Final       ASSESSMENT:  Post-partum day #1 s/p   Pregnancy complicated by NO COMPLICATIONS    Doing well.       PLAN:   Continue routine postpartum cares  Anticipate discharge tomorrow    Blossom Singer PA-C

## 2020-02-27 NOTE — PLAN OF CARE
Medication:    Outpatient Medications Marked as Taking for the 4/26/19 encounter (Refill) with Anthony Pelayo, DO   Medication Sig Dispense Refill   • methylpheniDATE (RITALIN) 10 MG tablet Take 1 tablet by mouth 2 times daily. 60 tablet 0       Message to Prescriber:     Pharmacy has been verified.    [] Patient completely out of medication     Patient currently has follow up appointment scheduled       Vital signs stable.Fundus firm, midline, scant lochia. Patient voiding without difficulty. Up independently in room without dizziness. Taking tylenol and ibuprofen for pain management. Breastfeeding infant every 2-3 hours, latch verified. Encouraged to call with questions/concerns. Will continue to monitor.

## 2020-02-27 NOTE — PLAN OF CARE
VSS. Voiding adequately on own. Scant vag bleeding. Pain controlled w/ ibuprofen and tylenol. Breast feeding well w/ some assistance.

## 2020-02-28 VITALS
SYSTOLIC BLOOD PRESSURE: 122 MMHG | DIASTOLIC BLOOD PRESSURE: 70 MMHG | TEMPERATURE: 98 F | RESPIRATION RATE: 16 BRPM | WEIGHT: 154 LBS | HEIGHT: 64 IN | BODY MASS INDEX: 26.29 KG/M2

## 2020-02-28 PROCEDURE — 25000132 ZZH RX MED GY IP 250 OP 250 PS 637: Performed by: OBSTETRICS & GYNECOLOGY

## 2020-02-28 RX ORDER — ACETAMINOPHEN 325 MG/1
650 TABLET ORAL EVERY 4 HOURS PRN
COMMUNITY
Start: 2020-02-28

## 2020-02-28 RX ORDER — IBUPROFEN 800 MG/1
800 TABLET, FILM COATED ORAL EVERY 6 HOURS PRN
COMMUNITY
Start: 2020-02-28

## 2020-02-28 RX ORDER — AMOXICILLIN 250 MG
1 CAPSULE ORAL 2 TIMES DAILY
COMMUNITY
Start: 2020-02-28

## 2020-02-28 RX ADMIN — ACETAMINOPHEN 650 MG: 325 TABLET, FILM COATED ORAL at 08:13

## 2020-02-28 RX ADMIN — PRENATAL VITAMINS-IRON FUMARATE 27 MG IRON-FOLIC ACID 0.8 MG TABLET 1 TABLET: at 08:13

## 2020-02-28 RX ADMIN — IBUPROFEN 800 MG: 400 TABLET, FILM COATED ORAL at 08:13

## 2020-02-28 RX ADMIN — ACETAMINOPHEN 650 MG: 325 TABLET, FILM COATED ORAL at 01:02

## 2020-02-28 RX ADMIN — IBUPROFEN 800 MG: 400 TABLET, FILM COATED ORAL at 01:02

## 2020-02-28 RX ADMIN — SENNOSIDES AND DOCUSATE SODIUM 1 TABLET: 8.6; 5 TABLET ORAL at 08:13

## 2020-02-28 NOTE — LACTATION NOTE
Follow up visit with Ekta & suzy Kaufman. Ekta reports feeding is going well. She has no concerns. Getting ready for discharge. Reviewed milk supply and engorgement.    At time of visit, suzy Kaufman breastfeeding in cross cradle at right breast, with lips flanged widely and nutritive suck pattern observed. No intervention or coaching needed.    Feeding plan: Recommend unlimited, frequent breast feedings: At least 8 - 12 times every 24 hours. Avoid pacifiers and supplementation with formula unless medically indicated. Encouraged use of feeding log and to record feedings, and void/stool patterns. Ekta has a pump for home use. Follow up with [facility]. Reviewed outpatient lactation resources. Ekta appreciative of visit.    Ada Gary, BSN, PHN, RN-C, MNN, IBCLC

## 2020-02-28 NOTE — PLAN OF CARE
VSS. Fundus firm and midline. Scant flow. Pain 6/10, pain controled with tylenol and ibuprofen per MAR. Breastfeeding. Ambulating free of dizziness or lightheaded. Voiding without difficulty. Encouraged to call with needs, questions, or concerns. Will continue to monitor.

## 2020-02-28 NOTE — PLAN OF CARE
Vital signs stable. Postpartum assessment WDL. Pain controlled with tylenol and ibuprofen. Using ice packs to help with perineal discomfort. Patient voiding without difficulty. Breastfeeding on cue with minimal assist. Patient and infant bonding well. Will continue with current plan of care.

## 2020-02-28 NOTE — PROGRESS NOTES
[facility]       DAILY NOTE - POSTPARTUM DAY 2     SUBJECTIVE:     Pain controlled? Yes  Tolerating a regular diet? YES  Ambulating? YES  Voiding without difficulty? Yes    OBJECTIVE:  Vitals:    20 0822 20 1548 20 0102 20 1000   BP: 119/78 112/75 116/73 122/70   BP Location:  Left arm     Resp: 18 16 16 16   Temp: 97.6  F (36.4  C) 97.4  F (36.3  C) 97.4  F (36.3  C) 98  F (36.7  C)   TempSrc: Oral Oral Oral Oral   Weight:       Height:           Constitutional: healthy, alert and no distress    Abdomen:  Uterine fundus is firm, non-tender and at the level of the umbilicus     Incision: Healing well      LABS:  Hemoglobin   Date Value Ref Range Status   2017 12.9 11.7 - 15.7 g/dL Final       ASSESSMENT:  Post-partum day #2 s/p   Pregnancy complicated by NO COMPLICATIONS    Doing well.       PLAN:   Discharge today.  Return to clinic in 6 weeks.   Continue routine postpartum cares    Roslyn Bonilla MD

## 2020-11-07 ENCOUNTER — HEALTH MAINTENANCE LETTER (OUTPATIENT)
Age: 35
End: 2020-11-07

## 2021-09-05 ENCOUNTER — HEALTH MAINTENANCE LETTER (OUTPATIENT)
Age: 36
End: 2021-09-05

## 2021-09-13 ENCOUNTER — TELEPHONE (OUTPATIENT)
Dept: AUDIOLOGY | Facility: CLINIC | Age: 36
End: 2021-09-13

## 2021-09-13 NOTE — TELEPHONE ENCOUNTER
Letter of medical necessity for N7 upgrade written and routed to surgeon to sign.  Once signed, it will route to [employer], who will assist with requesting authorization from insurance.    Schedulers notified to contact patient to set up:  1. CIB 90 upgrade fitting in 8 weeks  2. CIB 90 4-6 weeks after fitting for follow up and testing  Any CI provider      César Becerra, CCC-A, [facility]  Licensed Audiologist  [place] #[id number]

## 2021-11-10 ENCOUNTER — OFFICE VISIT (OUTPATIENT)
Dept: AUDIOLOGY | Facility: CLINIC | Age: 36
End: 2021-11-10
Payer: COMMERCIAL

## 2021-11-10 DIAGNOSIS — H90.3 SENSORINEURAL HEARING LOSS, BILATERAL: Primary | ICD-10-CM

## 2021-11-10 NOTE — PROGRESS NOTES
AUDIOLOGY REPORT    BACKGROUND INFORMATION: Dr. 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 The patient is being seen for CI follow-up programming and equipment upgrade to a left N7 sound processor on 11/10/2021 in the Audiology Clinic at [facility].    PATIENT REPORT: The patient arrived with her left N7 upgrade kit.     FITTING SESSION: Dr. Frida Smith, CI surgeon, ordered today's appointment. The patient came to the clinic for adjustment to the programs in the external speech processor and for assessment of the external components of the CI system. These components provide power and data to the internal device. Sound is only heard once the external portion is activated. Postoperative treatment, including device fitting and adjustment, audiologic assessments, and training are required at regular intervals. Testing can include electropsychophysical measures of threshold, comfort, and loudness balancing, which are completed to update the program.    Processor type: N7 fit today, 910 as backup   Magnet strength: 2 - No irritation    TEST RESULTS:   Tympanograms: Did not test  Electrode Impedances: Could not test with the N22  Neural Response Testing: Could not test with the N22  Facial Stimulation: Absent  Tinnitus: Absent  Balance Problems: Absent  Pain/Discomfort: Absent  Strategy: SPEAK     Programs in N7 (MAP 90):  1. Home  2. Cafe  3. SCAN  4. Music    Programs in 910 (MAP 89 - Not changed today):  1. Home  2. Cafe  3. SCAN  4. Music    Number of Channels per Program: 20    COMMENTS: The preferred MAP from the 910 sound processor was converted for use with the N7 sound processor. The patient reported good volume and sound quality, although she noted her own voice  sounded a little lower pitched.     All of the equipment in the N7 sound processor kit was reviewed. The sound processor was paired with the patient's Android phone and the Nucleus Smart jackie. Use of both was reviewed. The patient was given time to practice use and placement/removal of the sound processor. The patient's questions were answered. Warranties were reviewed.      SUMMARY AND RECOMMENDATIONS: Ekta was seen for CI follow-up programming and equipment upgrade to a left N7 sound processor today. She will return in 4 weeks for speech perception testing and CI follow-up programming, sooner if concerns arise. Please call this clinic with questions regarding these results or recommendations.      César Patel, [facility]  Licensed Audiologist  [place] #[id number]

## 2021-12-02 DIAGNOSIS — H90.5 SNHL (SENSORINEURAL HEARING LOSS): Primary | ICD-10-CM

## 2021-12-08 ENCOUNTER — OFFICE VISIT (OUTPATIENT)
Dept: AUDIOLOGY | Facility: CLINIC | Age: 36
End: 2021-12-08
Payer: COMMERCIAL

## 2021-12-08 DIAGNOSIS — H90.5 SNHL (SENSORINEURAL HEARING LOSS): ICD-10-CM

## 2021-12-08 DIAGNOSIS — H90.3 SENSORINEURAL HEARING LOSS, BILATERAL: Primary | ICD-10-CM

## 2021-12-08 PROCEDURE — 92567 TYMPANOMETRY: CPT | Mod: 59 | Performed by: AUDIOLOGIST

## 2021-12-08 PROCEDURE — 92626 EVAL AUD FUNCJ 1ST HOUR: CPT | Mod: 59 | Performed by: AUDIOLOGIST

## 2021-12-08 PROCEDURE — 92604 REPROGRAM COCHLEAR IMPLT 7/>: CPT | Performed by: AUDIOLOGIST

## 2021-12-08 NOTE — PROGRESS NOTES
AUDIOLOGY REPORT    BACKGROUND INFORMATION: Dr. 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 She upgraded to a left N7 sound processor on 11/10/2021. The patient is being seen for CI follow-up programming and speech perception testing on 12/8/2021 in the Audiology Clinic at [facility].    PATIENT REPORT: The patient reports that she has liked the new N7 sound processor; however, she feels certain high frequency sounds, such as paper crinkling, are too loud.    FITTING SESSION: Dr. Frida Smith, CI surgeon, ordered today's appointment. The patient came to the clinic for adjustment to the programs in the external speech processor and for assessment of the external components of the CI system. These components provide power and data to the internal device. Sound is only heard once the external portion is activated. Postoperative treatment, including device fitting and adjustment, audiologic assessments, and training are required at regular intervals. Testing can include electropsychophysical measures of threshold, comfort, and loudness balancing, which are completed to update the program.     Processor type: N7    Magnet strength: 2 - No irritation    TEST RESULTS:   Electrode Impedances: Could not test with the N22  Neural Response Testing: Could not test with the N22  Facial Stimulation: Absent  Tinnitus: Absent  Balance Problems: Absent  Pain/Discomfort: Absent  Strategy: SPEAK      Programs in N7 (MAP 90):  1. Home  2. Cafe  3. SCAN  4. Music    Number of Channels per Program: 20    COMMENTS: The high frequencies were tilted down 3 units. Ekta reported more comfort and noted speech still sounded crisp and clear.     METHOD: Speech perception testing is conducted at  regular intervals to determine the degree of benefit the patient is obtaining from the CI. Tests are conducted using the cochlear implant without the benefit of lipreading. All tests are conducted in a sound-treated room. Perception of monosyllabic words and words in sentences are tested. Results usually show improvement over time. A decrease in performance indicates the need for re-programming of the prosthesis and/or replacement of components.     INTERVAL: 30.5 years    TEST RESULTS: 35 minutes were spent assessing the patient s auditory rehabilitation status.    Devices used for Testing:   Right ear: N/A  Left ear: N7     Tympanograms:   Right: Hypermobile  Left: Within normal limits    Soundfield Aided Thresholds:   Left: Detection in the mild range    CNC Words Test:  The patient repeats 25 single syllable words, auditory only. The words are presented at 60 dB SPL (conversational level) delivered from a CD player.    22 years Post-Activation of CI:  Left: 76% words    25 years Post-Activation of CI:  Left: 76% words    30.5 years Post-Activation of CI:  Left: 84% words, 95% phonemes    The Hearing in Noise Test (HINT):  The patient repeats 20 sentences, auditory only. The sentences are presented in each condition at 60 dB SPL (conversational level) delivered from a CD player.     13 year Post-Activation of CI:   Left: 98% in quiet, 96% in +7 dB SNR background babble, 61% in +5 dB SNR background babble      22 years Post-Activation of CI:  Left: 100% in quiet, 91% in +7 dB SNR background babble, 78% in +5 dB SNR background babble     25 years Post-Activation of CI:  Left: 100% in quiet, 95% in +7 dB SNR background babble, 56% in +5 dB SNR background babble     30.5 years Post-Activation of CI:  Left: Did not test, as the patient has transitioned to [employer] Sentences Test    [employer] Sentences Test:  The patient repeats 20 sentences, auditory only.  The sentences are presented at 60 dB SPL (conversational level)  delivered from a CD player.     22 years Post-Activation of CI:  Left: 90% in quiet    25 years Post-Activation of CI:  Left: 86% in quiet    30.5 years Post-Activation of CI:  Left: 80% in quiet, 43% in +10 dB SNR background babble     COMMENTS: Audibility and speech perception scores are stable.     SUMMARY AND RECOMMENDATIONS: Ekta was seen for CI follow-up programming and speech perception testing today. She will return annually for speech perception testing, sooner for programming as needed. Please call this clinic with questions regarding these results or recommendations.      César Patel, [facility]  Licensed Audiologist  [place] #[id number]

## 2021-12-26 ENCOUNTER — HEALTH MAINTENANCE LETTER (OUTPATIENT)
Age: 36
End: 2021-12-26

## 2022-10-29 ENCOUNTER — HEALTH MAINTENANCE LETTER (OUTPATIENT)
Age: 37
End: 2022-10-29

## 2023-04-02 ENCOUNTER — HEALTH MAINTENANCE LETTER (OUTPATIENT)
Age: 38
End: 2023-04-02

## 2023-12-22 ENCOUNTER — LAB REQUISITION (OUTPATIENT)
Dept: LAB | Facility: CLINIC | Age: 38
End: 2023-12-22
Payer: COMMERCIAL

## 2023-12-22 DIAGNOSIS — Z01.419 ENCOUNTER FOR GYNECOLOGICAL EXAMINATION (GENERAL) (ROUTINE) WITHOUT ABNORMAL FINDINGS: ICD-10-CM

## 2023-12-22 PROCEDURE — G0145 SCR C/V CYTO,THINLAYER,RESCR: HCPCS | Mod: ORL | Performed by: OBSTETRICS & GYNECOLOGY

## 2023-12-22 PROCEDURE — 87624 HPV HI-RISK TYP POOLED RSLT: CPT | Mod: ORL | Performed by: OBSTETRICS & GYNECOLOGY

## 2023-12-27 LAB
BKR LAB AP GYN ADEQUACY: NORMAL
BKR LAB AP GYN INTERPRETATION: NORMAL
BKR LAB AP HPV REFLEX: NORMAL
BKR LAB AP LMP: NORMAL
BKR LAB AP PREVIOUS ABNL DX: NORMAL
BKR LAB AP PREVIOUS ABNORMAL: NORMAL
PATH REPORT.COMMENTS IMP SPEC: NORMAL
PATH REPORT.COMMENTS IMP SPEC: NORMAL
PATH REPORT.RELEVANT HX SPEC: NORMAL

## 2023-12-29 LAB
HUMAN PAPILLOMA VIRUS 16 DNA: NEGATIVE
HUMAN PAPILLOMA VIRUS 18 DNA: NEGATIVE
HUMAN PAPILLOMA VIRUS FINAL DIAGNOSIS: NORMAL
HUMAN PAPILLOMA VIRUS OTHER HR: NEGATIVE

## 2024-06-08 ENCOUNTER — HEALTH MAINTENANCE LETTER (OUTPATIENT)
Age: 39
End: 2024-06-08